# Patient Record
Sex: FEMALE | Race: WHITE | Employment: FULL TIME | URBAN - NONMETROPOLITAN AREA
[De-identification: names, ages, dates, MRNs, and addresses within clinical notes are randomized per-mention and may not be internally consistent; named-entity substitution may affect disease eponyms.]

---

## 2017-02-22 ENCOUNTER — OFFICE VISIT (OUTPATIENT)
Dept: OBGYN | Age: 22
End: 2017-02-22

## 2017-02-22 VITALS
RESPIRATION RATE: 16 BRPM | BODY MASS INDEX: 24.35 KG/M2 | DIASTOLIC BLOOD PRESSURE: 66 MMHG | HEIGHT: 61 IN | WEIGHT: 129 LBS | SYSTOLIC BLOOD PRESSURE: 102 MMHG | HEART RATE: 76 BPM

## 2017-02-22 DIAGNOSIS — Z30.011 ENCOUNTER FOR INITIAL PRESCRIPTION OF CONTRACEPTIVE PILLS: Primary | ICD-10-CM

## 2017-02-22 PROCEDURE — 99214 OFFICE O/P EST MOD 30 MIN: CPT | Performed by: OBSTETRICS & GYNECOLOGY

## 2017-02-22 RX ORDER — NORETHINDRONE ACETATE AND ETHINYL ESTRADIOL 1MG-20(21)
1 KIT ORAL DAILY
Qty: 1 PACKET | Refills: 3 | Status: SHIPPED | OUTPATIENT
Start: 2017-02-22 | End: 2017-07-26 | Stop reason: ALTCHOICE

## 2017-02-22 ASSESSMENT — ENCOUNTER SYMPTOMS
VOMITING: 0
ABDOMINAL PAIN: 0
NAUSEA: 0
COUGH: 0
SHORTNESS OF BREATH: 0
CHEST TIGHTNESS: 0

## 2017-07-26 ENCOUNTER — HOSPITAL ENCOUNTER (OUTPATIENT)
Age: 22
Setting detail: SPECIMEN
Discharge: HOME | End: 2017-07-26

## 2017-07-26 ENCOUNTER — OFFICE VISIT (OUTPATIENT)
Dept: PRIMARY CARE CLINIC | Age: 22
End: 2017-07-26
Payer: MEDICAID

## 2017-07-26 VITALS
SYSTOLIC BLOOD PRESSURE: 100 MMHG | OXYGEN SATURATION: 99 % | WEIGHT: 125.6 LBS | HEIGHT: 61 IN | HEART RATE: 82 BPM | DIASTOLIC BLOOD PRESSURE: 80 MMHG | TEMPERATURE: 98.4 F | RESPIRATION RATE: 16 BRPM | BODY MASS INDEX: 23.71 KG/M2

## 2017-07-26 DIAGNOSIS — Z20.2 POSSIBLE EXPOSURE TO STD: Primary | ICD-10-CM

## 2017-07-26 LAB
-: NORMAL
AMORPHOUS: NORMAL
BACTERIA: NORMAL
BILIRUBIN URINE: NEGATIVE
CASTS UA: NORMAL /LPF (ref 0–2)
COLOR: ABNORMAL
COMMENT UA: ABNORMAL
CRYSTALS, UA: NORMAL /HPF
EPITHELIAL CELLS UA: NORMAL /HPF (ref 0–5)
GLUCOSE URINE: NEGATIVE
HCG(URINE) PREGNANCY TEST: POSITIVE
KETONES, URINE: NEGATIVE
LEUKOCYTE ESTERASE, URINE: NEGATIVE
MUCUS: NORMAL
NITRITE, URINE: NEGATIVE
OTHER OBSERVATIONS UA: NORMAL
PH UA: 6 (ref 5–6)
PROTEIN UA: NEGATIVE
RBC UA: NORMAL /HPF (ref 0–4)
RENAL EPITHELIAL, UA: NORMAL /HPF
SPECIFIC GRAVITY UA: 1 (ref 1.01–1.02)
TRICHOMONAS: NORMAL
TURBIDITY: ABNORMAL
URINE HGB: NEGATIVE
UROBILINOGEN, URINE: NORMAL
WBC UA: NORMAL /HPF (ref 0–4)
YEAST: NORMAL

## 2017-07-26 PROCEDURE — 99213 OFFICE O/P EST LOW 20 MIN: CPT | Performed by: NURSE PRACTITIONER

## 2017-07-26 PROCEDURE — 81001 URINALYSIS AUTO W/SCOPE: CPT | Performed by: NURSE PRACTITIONER

## 2017-07-26 PROCEDURE — 81003 URINALYSIS AUTO W/O SCOPE: CPT | Performed by: NURSE PRACTITIONER

## 2017-07-26 PROCEDURE — 84703 CHORIONIC GONADOTROPIN ASSAY: CPT | Performed by: NURSE PRACTITIONER

## 2017-07-26 ASSESSMENT — ENCOUNTER SYMPTOMS
COUGH: 0
WHEEZING: 0
SHORTNESS OF BREATH: 0

## 2017-07-28 ENCOUNTER — TELEPHONE (OUTPATIENT)
Dept: FAMILY MEDICINE CLINIC | Age: 22
End: 2017-07-28

## 2017-07-28 DIAGNOSIS — A74.9 CHLAMYDIA INFECTION: Primary | ICD-10-CM

## 2017-07-28 LAB
C. TRACHOMATIS DNA ,URINE: ABNORMAL
N. GONORRHOEAE DNA, URINE: NEGATIVE

## 2017-07-28 RX ORDER — AZITHROMYCIN 500 MG/1
1000 TABLET, FILM COATED ORAL ONCE
Qty: 2 TABLET | Refills: 0 | Status: SHIPPED | OUTPATIENT
Start: 2017-07-28 | End: 2017-07-28

## 2018-02-06 ENCOUNTER — OFFICE VISIT (OUTPATIENT)
Dept: OPTOMETRY | Age: 23
End: 2018-02-06
Payer: COMMERCIAL

## 2018-02-06 DIAGNOSIS — H52.203 MYOPIA OF BOTH EYES WITH ASTIGMATISM: Primary | ICD-10-CM

## 2018-02-06 DIAGNOSIS — H52.13 MYOPIA OF BOTH EYES WITH ASTIGMATISM: Primary | ICD-10-CM

## 2018-02-06 PROCEDURE — 92014 COMPRE OPH EXAM EST PT 1/>: CPT | Performed by: OPTOMETRIST

## 2018-02-06 ASSESSMENT — REFRACTION_MANIFEST
OD_CYLINDER: -1.25
OS_SPHERE: -1.75
OS_CYLINDER: -0.50
OD_SPHERE: -1.00
OS_AXIS: 177
OD_AXIS: 005

## 2018-02-06 ASSESSMENT — TONOMETRY
OD_IOP_MMHG: 15
IOP_METHOD: APPLANATION W FLURESS DROP
OS_IOP_MMHG: 15

## 2018-02-06 ASSESSMENT — REFRACTION_WEARINGRX
OD_CYLINDER: -1.00
OD_SPHERE: -1.25
SPECS_TYPE: SVL
OS_SPHERE: -1.75
OD_AXIS: 005
OS_CYLINDER: -0.50
OS_AXIS: 180

## 2018-02-06 ASSESSMENT — REFRACTION_CURRENTRX
OD_SPHERE: -1.75
OS_SPHERE: -2.00
OS_BRAND: FRESHLOOK COLORS
OD_BRAND: FRESHLOOK COLORS

## 2018-02-06 ASSESSMENT — SLIT LAMP EXAM - LIDS
COMMENTS: NORMAL
COMMENTS: NORMAL

## 2018-02-06 ASSESSMENT — VISUAL ACUITY
OS_SC: 20/70
OD_SC+: -1
OD_SC: 20/60
METHOD: SNELLEN - LINEAR

## 2018-02-06 NOTE — PROGRESS NOTES
Marylene Rideau presents today for   Chief Complaint   Patient presents with    Vision Exam   .    HPI     Last Vision Exam: 12/8/2016 Dr Aurora Larkin  Last Ophthalmology Exam: none  Last Filled Glasses Rx: 12/8/2016  Insurance: March vision, eli for e,f,l  Update: glasses- broken, wants CL update for written Rx to take(orders on line)  Decided not to do the contact lenses now   Will call when ready                 Main Ophthalmology Exam     External Exam       Right Left    External Normal Normal          Slit Lamp Exam       Right Left    Lids/Lashes Normal Normal    Conjunctiva/Sclera White and quiet White and quiet    Cornea Clear Clear    Anterior Chamber Deep and quiet Deep and quiet    Iris Round and reactive Round and reactive    Lens Clear Clear    Vitreous Normal Normal          Fundus Exam       Right Left    Disc Normal Normal    C/D Ratio 0.25 0.25    Macula Normal Normal    Vessels Normal Normal                   Tonometry     Tonometry (Applanation w Fluress drop, 11:09 AM)       Right Left    Pressure 15 15               Visual Acuity (Snellen - Linear)       Right Left    Dist sc 20/60 -1 20/70         Not recorded          Ophthalmology Exam     Wearing Rx       Sphere Cylinder Axis    Right -1.25 -1.00 005    Left -1.75 -0.50 180    Type:  SVL                Manifest Refraction     Manifest Refraction       Sphere Cylinder [place] Dist [place]    Right -1.00 -1.25 005 20/20    Left -1.75 -0.50 177 20/20          Manifest Refraction #2 (Auto)       Sphere Cylinder [place] Dist VA    Right -1.00 -1.50 002     Left -1.75 -0.75 178                Final Rx       Sphere Cylinder Axis    Right -1.00 -1.25 005    Left -1.75 -0.50 177    Type:  SVL    Expiration Date:  2/7/2020            1. Myopia of both eyes with astigmatism           Patient Instructions   New glasses recommended;  Call and schedule to update the contact lenses      Return in about 2 years (around 2/6/2020) for complete eye exam.

## 2018-07-12 ENCOUNTER — OFFICE VISIT (OUTPATIENT)
Dept: OPTOMETRY | Age: 23
End: 2018-07-12

## 2018-07-12 DIAGNOSIS — H52.203 MYOPIA OF BOTH EYES WITH ASTIGMATISM: Primary | ICD-10-CM

## 2018-07-12 DIAGNOSIS — H52.13 MYOPIA OF BOTH EYES WITH ASTIGMATISM: Primary | ICD-10-CM

## 2018-07-12 PROCEDURE — 92314 C-LENS FITG TECH OU: CPT | Performed by: OPTOMETRIST

## 2018-07-12 ASSESSMENT — REFRACTION_CURRENTRX
OS_ADDL_SPECS: BLUE
OS_BRAND: CIBA: FRESHLOOK COLORBLENDS
OS_SPHERE: -2.00
OD_SPHERE: -1.75
OD_ADDL_SPECS: BLUE
OD_BRAND: CIBA: FRESHLOOK COLORBLENDS

## 2018-07-12 ASSESSMENT — REFRACTION_MANIFEST
OS_CYLINDER: -0.50
OS_AXIS: 177
OD_CYLINDER: -1.25
OD_AXIS: 004
OS_SPHERE: -1.50
OD_SPHERE: -1.00

## 2018-07-12 ASSESSMENT — KERATOMETRY
OS_AXISANGLE_DEGREES: 95
OD_AXISANGLE_DEGREES: 84
OS_K2POWER_DIOPTERS: 46.25
OD_AXISANGLE2_DEGREES: 174
OS_K1POWER_DIOPTERS: 44.50
OD_K1POWER_DIOPTERS: 44.25
OD_K2POWER_DIOPTERS: 45.50
OS_AXISANGLE2_DEGREES: 5

## 2018-07-12 ASSESSMENT — VISUAL ACUITY
OD_SC+: -1
OD_SC: 20/80
METHOD: SNELLEN - LINEAR
OS_SC: 20/80
OS_SC+: -1

## 2018-07-12 ASSESSMENT — REFRACTION_WEARINGRX
OS_CYLINDER: -0.50
OS_SPHERE: -1.75
OS_AXIS: 177
OD_CYLINDER: -1.25
OD_AXIS: 005
OD_SPHERE: -1.00
SPECS_TYPE: SVL

## 2018-07-12 NOTE — PROGRESS NOTES
Eze Sepulveda presents today for   Chief Complaint   Patient presents with    Vision Exam   .    HPI     Last Vision Exam: 2/6/18  Last Ophthalmology Exam: N/A  Last Filled Glasses Rx: 2/6/18  Insurance: No Ins. Update: Contacts  Does not want colored contacts  Last wore contacts a year ago. Clear lenses                      Visual Acuity (Snellen - Linear)       Right Left    Dist sc 20/80 -1 20/80 -1        Keratometry     Keratometry       K1 Axis K2 Axis    Right 44.25 174 45.50 84    Left 44.50 5 46.25 95                Ophthalmology Exam     Wearing Rx       Sphere Cylinder Axis    Right -1.00 -1.25 005    Left -1.75 -0.50 177    Age:  6m    Type:  SVL                Manifest Refraction     Manifest Refraction       Sphere Cylinder Axis    Right -1.00 -1.25 004    Left -1.50 -0.50 177          Manifest Refraction #2 (Auto)       Sphere Cylinder Axis    Right -1.00 -1.50 002    Left -1.75 -1.00 176                 Final Rx       Sphere Cylinder Axis    Right -1.00 -1.25 004    Left -1.50 -0.50 177    Type:  SVL    Expiration Date:  7/12/2020           Contact Lens Current Rx     Current Contact Lens Rx       Brand Sphere Addl. Specs    Right Ciba: Freshlook Colorblends -1.75 Blue    Left Ciba: Freshlook Colorblends -2.00 Blue                TRIALS   Final Contact Lens Rx       Brand Sphere Cylinder Axis    Right air optix for astigmatism  -1.25 -0.75 180    Left air optix -1.75      Expiration Date:  7/2019    Replacement:  Monthly    Wearing Schedule:  Daily wear           Plan:     1. Myopia of both eyes with astigmatism             Patient Instructions   New contact lisa trials given;  $40 fit fee      Return in about 2 weeks (around 7/26/2018) for contact lens follow-up.

## 2019-03-13 ENCOUNTER — TELEPHONE (OUTPATIENT)
Dept: OPTOMETRY | Age: 24
End: 2019-03-13

## 2019-03-18 ASSESSMENT — SLIT LAMP EXAM - LIDS
COMMENTS: NORMAL
COMMENTS: NORMAL

## 2019-09-23 ENCOUNTER — TELEPHONE (OUTPATIENT)
Dept: OPTOMETRY | Age: 24
End: 2019-09-23

## 2019-09-24 ENCOUNTER — TELEPHONE (OUTPATIENT)
Dept: OPTOMETRY | Age: 24
End: 2019-09-24

## 2019-09-27 ENCOUNTER — OFFICE VISIT (OUTPATIENT)
Dept: PRIMARY CARE CLINIC | Age: 24
End: 2019-09-27
Payer: MEDICAID

## 2019-09-27 ENCOUNTER — HOSPITAL ENCOUNTER (OUTPATIENT)
Age: 24
Setting detail: SPECIMEN
Discharge: HOME | End: 2019-09-27
Payer: MEDICAID

## 2019-09-27 VITALS
OXYGEN SATURATION: 98 % | HEART RATE: 88 BPM | BODY MASS INDEX: 25.34 KG/M2 | TEMPERATURE: 98.2 F | DIASTOLIC BLOOD PRESSURE: 70 MMHG | RESPIRATION RATE: 16 BRPM | HEIGHT: 61 IN | SYSTOLIC BLOOD PRESSURE: 120 MMHG | WEIGHT: 134.2 LBS

## 2019-09-27 DIAGNOSIS — R30.0 DYSURIA: Primary | ICD-10-CM

## 2019-09-27 DIAGNOSIS — R30.0 DYSURIA: ICD-10-CM

## 2019-09-27 DIAGNOSIS — N30.01 ACUTE CYSTITIS WITH HEMATURIA: ICD-10-CM

## 2019-09-27 LAB
-: ABNORMAL
AMORPHOUS: ABNORMAL
BACTERIA: ABNORMAL
BILIRUBIN URINE: NEGATIVE
CASTS UA: ABNORMAL /LPF (ref 0–2)
COLOR: ABNORMAL
COMMENT UA: ABNORMAL
CRYSTALS, UA: ABNORMAL /HPF
EPITHELIAL CELLS UA: ABNORMAL /HPF (ref 0–5)
GLUCOSE URINE: NEGATIVE
KETONES, URINE: ABNORMAL
LEUKOCYTE ESTERASE, URINE: ABNORMAL
MUCUS: ABNORMAL
NITRITE, URINE: NEGATIVE
OTHER OBSERVATIONS UA: ABNORMAL
PH UA: 6 (ref 5–6)
PROTEIN UA: ABNORMAL
RBC UA: ABNORMAL /HPF (ref 0–4)
RENAL EPITHELIAL, UA: ABNORMAL /HPF
SPECIFIC GRAVITY UA: 1.02 (ref 1.01–1.02)
TRICHOMONAS: ABNORMAL
TURBIDITY: ABNORMAL
URINE HGB: ABNORMAL
UROBILINOGEN, URINE: NORMAL
WBC UA: >50 /HPF (ref 0–4)
YEAST: ABNORMAL

## 2019-09-27 PROCEDURE — 1036F TOBACCO NON-USER: CPT | Performed by: NURSE PRACTITIONER

## 2019-09-27 PROCEDURE — 99214 OFFICE O/P EST MOD 30 MIN: CPT | Performed by: NURSE PRACTITIONER

## 2019-09-27 PROCEDURE — 81001 URINALYSIS AUTO W/SCOPE: CPT

## 2019-09-27 PROCEDURE — G8427 DOCREV CUR MEDS BY ELIG CLIN: HCPCS | Performed by: NURSE PRACTITIONER

## 2019-09-27 PROCEDURE — G8419 CALC BMI OUT NRM PARAM NOF/U: HCPCS | Performed by: NURSE PRACTITIONER

## 2019-09-27 RX ORDER — NITROFURANTOIN 25; 75 MG/1; MG/1
100 CAPSULE ORAL 2 TIMES DAILY
Qty: 20 CAPSULE | Refills: 0 | Status: SHIPPED | OUTPATIENT
Start: 2019-09-27 | End: 2019-10-07

## 2019-09-27 ASSESSMENT — PATIENT HEALTH QUESTIONNAIRE - PHQ9
SUM OF ALL RESPONSES TO PHQ9 QUESTIONS 1 & 2: 0
2. FEELING DOWN, DEPRESSED OR HOPELESS: 0
SUM OF ALL RESPONSES TO PHQ QUESTIONS 1-9: 0
SUM OF ALL RESPONSES TO PHQ QUESTIONS 1-9: 0
1. LITTLE INTEREST OR PLEASURE IN DOING THINGS: 0

## 2019-09-27 NOTE — PROGRESS NOTES
[address]  [facility]  [address] [facility], [place]   Phone: [phone]  Fax: [phone]    Date: 9/27/2019   Patient:  Ifrah Salomon   YOB: 1995 Age: 25 y.o. MRN: [id number]   PCP: Ilia Schroeder MD       Subjective:    Chief Complaint   Patient presents with    Urinary Tract Infection      x 2 days       HPI: Patient presents with complaints of dysuria for the past 2 days. She reports associated mild suprapubic tenderness. She denies fever, nausea, vomiting, hematuria, or flank pain. She has tried cranberry juice without relief. She has had 1 UTI before. She has never had pyelonephritis. She has had kidney stones before. History is obtained from the patient and previous medical records. All other review of systems negative. No Known Allergies    Current Outpatient Medications   Medication Sig Dispense Refill    nitrofurantoin, macrocrystal-monohydrate, (MACROBID) 100 MG capsule Take 1 capsule by mouth 2 times daily for 10 days 20 capsule 0     No current facility-administered medications for this visit. No past medical history on file. Social History     Tobacco Use    Smoking status: Never Smoker    Smokeless tobacco: Never Used   Substance Use Topics    Alcohol use: Yes     Alcohol/week: 0.0 standard drinks     Comment: socially    Drug use: No       Significant family and surgical history reviewed as noted in the patient's record. Objective:    Physical Exam:  Vitals: /70 (Site: Left Upper Arm, Position: Sitting, Cuff Size: Medium Adult)   Pulse 88   Temp 98.2 °F (36.8 °C) (Tympanic)   Resp 16   Ht 5' 1\" (1.549 m)   Wt 134 lb 3.2 oz (60.9 kg)   LMP 09/06/2019 (Approximate)   SpO2 98%   Breastfeeding? No   BMI 25.36 kg/m²     LABS:  CBC:  No results for input(s): WBC, HGB, PLT in the last 72 hours. BMP:  No results for input(s): NA, K, CL, CO2, BUN, CREATININE, GLUCOSE in the last 72 hours.   Hepatic:  No results for

## 2020-01-15 ENCOUNTER — OFFICE VISIT (OUTPATIENT)
Dept: OPTOMETRY | Age: 25
End: 2020-01-15
Payer: COMMERCIAL

## 2020-01-15 PROCEDURE — 92015 DETERMINE REFRACTIVE STATE: CPT | Performed by: OPTOMETRIST

## 2020-01-15 PROCEDURE — 92014 COMPRE OPH EXAM EST PT 1/>: CPT | Performed by: OPTOMETRIST

## 2020-01-15 PROCEDURE — G8419 CALC BMI OUT NRM PARAM NOF/U: HCPCS | Performed by: OPTOMETRIST

## 2020-01-15 PROCEDURE — 1036F TOBACCO NON-USER: CPT | Performed by: OPTOMETRIST

## 2020-01-15 PROCEDURE — G8427 DOCREV CUR MEDS BY ELIG CLIN: HCPCS | Performed by: OPTOMETRIST

## 2020-01-15 ASSESSMENT — VISUAL ACUITY
OS_SC: 20/70
OD_SC: 20/80
METHOD: SNELLEN - LINEAR

## 2020-01-15 ASSESSMENT — REFRACTION_WEARINGRX
SPECS_TYPE: SVL
OD_CYLINDER: -1.25
OS_SPHERE: -1.75
OS_CYLINDER: -0.50
OD_SPHERE: -1.00
OS_AXIS: 177
OD_AXIS: 005

## 2020-01-15 ASSESSMENT — REFRACTION_MANIFEST
OS_SPHERE: -1.75
OS_AXIS: 175
OS_CYLINDER: -0.50
OD_CYLINDER: -1.25
OD_SPHERE: -1.25
OD_AXIS: 180

## 2020-01-15 ASSESSMENT — REFRACTION_CURRENTRX
OD_AXIS: 180
OS_SPHERE: -1.75
OD_CYLINDER: -0.75
OD_SPHERE: -1.25
OS_BRAND: AIR OPTIX
OD_BRAND: AIR OPTIX FOR ASTIGMATISM

## 2020-01-15 ASSESSMENT — TONOMETRY
OD_IOP_MMHG: 16
IOP_METHOD: NON-CONTACT AIR PUFF
OS_IOP_MMHG: 17

## 2020-01-15 ASSESSMENT — SLIT LAMP EXAM - LIDS
COMMENTS: NORMAL
COMMENTS: NORMAL

## 2020-01-15 NOTE — PROGRESS NOTES
abused: None     Forced sexual activity: None   Other Topics Concern    None   Social History Narrative    None     History reviewed. No pertinent past medical history.     Neuro/Psych     Neuro/Psych     Oriented x3:  Yes    Mood/Affect:  Normal                Main Ophthalmology Exam     External Exam       Right Left    External Normal Normal          Slit Lamp Exam       Right Left    Lids/Lashes Normal Normal    Conjunctiva/Sclera White and quiet White and quiet    Cornea Clear Clear    Anterior Chamber Deep and quiet Deep and quiet    Iris Round and reactive Round and reactive    Lens Clear Clear    Vitreous Normal Normal          Fundus Exam       Right Left    Disc Normal Normal    C/D Ratio 0.2 0.2    Macula Normal Normal    Vessels Normal Normal                  Tonometry     Tonometry (Non-contact air puff, 3:05 PM)       Right Left    Pressure 16 17   IOP.1              18.0  CH:  17.6          11.6  WS: 2.8          7.0                     Visual Acuity (Snellen - Linear)       Right Left    Dist sc 20/80 20/70         Not recorded          Ophthalmology Exam     Wearing Rx       Sphere Cylinder Axis    Right -1.00 -1.25 005    Left -1.75 -0.50 177    Age:  2yrs    Type:  SVL                Manifest Refraction     Manifest Refraction       Sphere Cylinder [place] Dist VA    Right -1.25 -1.25 180 20/20    Left -1.75 -0.50 175 20/20          Manifest Refraction #2 (Auto)       Sphere Cylinder [place] Dist VA    Right -1.00 -1.25 180     Left -1.50 -1.00 178                  Final Rx       Sphere Cylinder Axis    Right -1.25 -1.25 180    Left -1.75 -0.50 175    Type:  SVL    Expiration Date:  1/15/2022           Contact Lens Current Rx     Current Contact Lens Rx       Brand Sphere Cylinder Axis    Right air optix for astigmatism  -1.25 -0.75 180    Left air optix -1.75                  Final   Final Contact Lens Rx       Brand Base Curve Sphere Cylinder Axis    Right air optix for astigmatism   -1.50 -0.75 180    Left air optix 8.6 -2.00             Plan:     1. Myopia of both eyes with astigmatism             Patient Instructions   New trials given;  40$ fit fee needed;  2 week follow up      Return in about 2 weeks (around 1/29/2020) for contact lens follow-up.

## 2020-10-27 ENCOUNTER — TELEPHONE (OUTPATIENT)
Dept: OBGYN | Age: 25
End: 2020-10-27

## 2021-11-11 ENCOUNTER — OFFICE VISIT (OUTPATIENT)
Dept: OPTOMETRY | Age: 26
End: 2021-11-11
Payer: COMMERCIAL

## 2021-11-11 DIAGNOSIS — H52.203 MYOPIA OF BOTH EYES WITH ASTIGMATISM: ICD-10-CM

## 2021-11-11 DIAGNOSIS — H52.13 MYOPIA OF BOTH EYES WITH ASTIGMATISM: ICD-10-CM

## 2021-11-11 PROCEDURE — 92014 COMPRE OPH EXAM EST PT 1/>: CPT | Performed by: OPTOMETRIST

## 2021-11-11 PROCEDURE — 92015 DETERMINE REFRACTIVE STATE: CPT | Performed by: OPTOMETRIST

## 2021-11-11 ASSESSMENT — REFRACTION_MANIFEST
OD_CYLINDER: -1.25
OS_CYLINDER: -0.75
OD_AXIS: 180
OD_SPHERE: -1.25
OS_AXIS: 180
OS_SPHERE: -1.75

## 2021-11-11 ASSESSMENT — KERATOMETRY
OD_AXISANGLE_DEGREES: 081
OS_K2POWER_DIOPTERS: 45.75
OS_AXISANGLE2_DEGREES: 007
OS_AXISANGLE_DEGREES: 097
OS_K1POWER_DIOPTERS: 44.75
OD_K1POWER_DIOPTERS: 44.50
OD_AXISANGLE2_DEGREES: 171
METHOD_AUTO_MANUAL: AUTOMATED
OD_K2POWER_DIOPTERS: 46.00

## 2021-11-11 ASSESSMENT — REFRACTION_CURRENTRX
OS_SPHERE: -2.00
OD_AXIS: 180
OD_SPHERE: -1.50
OS_BASECURVE: 8.6
OS_BRAND: AIR OPTIX
OD_BRAND: AIR OPTIX FOR ASTIGMATISM
OD_CYLINDER: -0.75

## 2021-11-11 ASSESSMENT — VISUAL ACUITY
OD_SC: 20/80
METHOD: SNELLEN - LINEAR
OS_SC: 20/80

## 2021-11-11 ASSESSMENT — TONOMETRY
IOP_METHOD: NON-CONTACT AIR PUFF
OS_IOP_MMHG: 17
OD_IOP_MMHG: 19

## 2021-11-11 ASSESSMENT — ENCOUNTER SYMPTOMS
ALLERGIC/IMMUNOLOGIC NEGATIVE: 0
RESPIRATORY NEGATIVE: 0
EYES NEGATIVE: 0
GASTROINTESTINAL NEGATIVE: 0

## 2021-11-11 ASSESSMENT — REFRACTION_WEARINGRX
OD_AXIS: 180
OD_CYLINDER: -1.25
SPECS_TYPE: SVL
OS_AXIS: 175
OS_SPHERE: -1.75
OD_SPHERE: -1.25
OS_CYLINDER: -0.50

## 2021-11-11 NOTE — PATIENT INSTRUCTIONS
New glasses and contact lenses;  Using insurance towards the glasses;   We will hold the trials out for fit fee $57 and then we will also put in an order for one box of each eye

## 2021-11-11 NOTE — PROGRESS NOTES
Member of Clubs or Organizations: Not on file    Attends Club or Organization Meetings: Not on file    Marital Status: Not on file   Intimate Partner Violence:     Fear of Current or Ex-Partner: Not on file    Emotionally Abused: Not on file    Physically Abused: Not on file    Sexually Abused: Not on file   Housing Stability:     Unable to Pay for Housing in the Last Year: Not on file    Number of [place] in the Last Year: Not on file    Unstable Housing in the Last Year: Not on file     History reviewed. No pertinent past medical history.     ROS     Negative for: Constitutional, Gastrointestinal, Neurological, Skin, Genitourinary, Musculoskeletal, HENT, Endocrine, Cardiovascular, Eyes, Respiratory, Psychiatric, Allergic/Imm, Heme/Lymph          Main Ophthalmology Exam     External Exam       Right Left    External Normal Normal             <div id=\"MAIN_EXAM_REVIEWED\"></div>      Tonometry     Tonometry (Non-contact air puff, 4:12 PM)       Right Left    Pressure 19 17      Right / Left  IOPg 19.3 / 18.2  CH 11.1 / 11.7  WS 7.0 / 5.9                   Not recorded       Not recorded         Visual Acuity (Snellen - Linear)       Right Left    Dist sc 20/80 20/80          Pupils     Pupils       Pupils    Right PERRL    Left PERRL              Neuro/Psych     Neuro/Psych     Oriented x3: Yes    Mood/Affect: Normal              Keratometry     Keratometry (Automated)       K1 Axis K2 Axis    Right 44.50 171 46.00 081    Left 44.75 007 45.75 097                  Ophthalmology Exam     Wearing Rx       Sphere Cylinder Axis    Right -1.25 -1.25 180    Left -1.75 -0.50 175    Age: 1yr    Type: SVL              Manifest Refraction     Manifest Refraction       Sphere Cylinder Axis    Right -1.25 -1.25 180    Left -1.75 -0.75 180          Manifest Refraction #2 (Auto)       Sphere Cylinder Axis    Right -1.25 -1.25 179    Left -1.75 -1.00 002               Final Rx       Sphere Cylinder Axis    Right -1.25 -1.25 180    Left -1.75 -0.75 180    Type: SVL    Expiration Date: 11/12/2023          Final Contact Lens Rx       Brand Base Curve Sphere Cylinder Axis    Right air optix for astigmatism   -1.50 -0.75 180    Left air optix 8.6 -2.00      Expiration Date: 11/12/2022    Replacement: Monthly    Wearing Schedule: Daily wear   Final          No orders of the defined types were placed in this encounter. IMPRESSION:  1. Myopia of both eyes with astigmatism        PLAN:    1. New contact lens trials given; Ok to order;  2 boxes each eye; and new glasses recommended       Patient Instructions   New glasses and contact lenses;  Using insurance towards the glasses;   We will hold the trials out for fit fee $57 and then we will also put in an order for one box of each eye      Return in about 2 years (around 11/11/2023) for complete eye exam.

## 2022-08-24 ENCOUNTER — OFFICE VISIT (OUTPATIENT)
Dept: PRIMARY CARE CLINIC | Age: 27
End: 2022-08-24
Payer: COMMERCIAL

## 2022-08-24 VITALS
SYSTOLIC BLOOD PRESSURE: 96 MMHG | BODY MASS INDEX: 24.17 KG/M2 | OXYGEN SATURATION: 99 % | HEIGHT: 61 IN | HEART RATE: 62 BPM | DIASTOLIC BLOOD PRESSURE: 68 MMHG | WEIGHT: 128 LBS | TEMPERATURE: 98.2 F | RESPIRATION RATE: 18 BRPM

## 2022-08-24 DIAGNOSIS — K04.7 DENTAL INFECTION: Primary | ICD-10-CM

## 2022-08-24 PROCEDURE — 1036F TOBACCO NON-USER: CPT | Performed by: NURSE PRACTITIONER

## 2022-08-24 PROCEDURE — 99213 OFFICE O/P EST LOW 20 MIN: CPT | Performed by: NURSE PRACTITIONER

## 2022-08-24 PROCEDURE — 99212 OFFICE O/P EST SF 10 MIN: CPT | Performed by: NURSE PRACTITIONER

## 2022-08-24 PROCEDURE — G8427 DOCREV CUR MEDS BY ELIG CLIN: HCPCS | Performed by: NURSE PRACTITIONER

## 2022-08-24 PROCEDURE — G8420 CALC BMI NORM PARAMETERS: HCPCS | Performed by: NURSE PRACTITIONER

## 2022-08-24 RX ORDER — PENICILLIN V POTASSIUM 500 MG/1
500 TABLET ORAL 4 TIMES DAILY
Qty: 40 TABLET | Refills: 0 | Status: SHIPPED | OUTPATIENT
Start: 2022-08-24 | End: 2022-09-03

## 2022-08-24 SDOH — ECONOMIC STABILITY: FOOD INSECURITY: WITHIN THE PAST 12 MONTHS, YOU WORRIED THAT YOUR FOOD WOULD RUN OUT BEFORE YOU GOT MONEY TO BUY MORE.: NEVER TRUE

## 2022-08-24 SDOH — ECONOMIC STABILITY: FOOD INSECURITY: WITHIN THE PAST 12 MONTHS, THE FOOD YOU BOUGHT JUST DIDN'T LAST AND YOU DIDN'T HAVE MONEY TO GET MORE.: NEVER TRUE

## 2022-08-24 ASSESSMENT — SOCIAL DETERMINANTS OF HEALTH (SDOH): HOW HARD IS IT FOR YOU TO PAY FOR THE VERY BASICS LIKE FOOD, HOUSING, MEDICAL CARE, AND HEATING?: NOT HARD AT ALL

## 2022-08-24 ASSESSMENT — PATIENT HEALTH QUESTIONNAIRE - PHQ9
1. LITTLE INTEREST OR PLEASURE IN DOING THINGS: 0
SUM OF ALL RESPONSES TO PHQ QUESTIONS 1-9: 0
SUM OF ALL RESPONSES TO PHQ9 QUESTIONS 1 & 2: 0
SUM OF ALL RESPONSES TO PHQ QUESTIONS 1-9: 0
2. FEELING DOWN, DEPRESSED OR HOPELESS: 0

## 2022-08-24 ASSESSMENT — ENCOUNTER SYMPTOMS
RHINORRHEA: 0
SORE THROAT: 0
SINUS PRESSURE: 0

## 2022-08-24 NOTE — PROGRESS NOTES
[address] [facility]  [address] [facility], [id number]  [phone]      HPI:     Dental Pain   This is a new problem. The current episode started in the past 7 days. The problem occurs constantly. The problem has been unchanged. The pain is at a severity of 3/10. Associated symptoms include facial pain and thermal sensitivity. Pertinent negatives include no fever or sinus pressure. She has tried acetaminophen and NSAIDs for the symptoms. The treatment provided no relief. Current Outpatient Medications   Medication Sig Dispense Refill    penicillin v potassium (VEETID) 500 MG tablet Take 1 tablet by mouth 4 times daily for 10 days 40 tablet 0     No current facility-administered medications for this visit. No Known Allergies    All patients pastmedical, surgical, social and family history has been reviewed. Subjective:      Review of Systems   Constitutional:  Negative for activity change, appetite change, fatigue and fever. HENT:  Positive for dental problem. Negative for congestion, postnasal drip, rhinorrhea, sinus pressure and sore throat. Neurological:  Negative for dizziness, light-headedness and headaches. Objective:      Physical Exam  Vitals and nursing note reviewed. Constitutional:       Appearance: Normal appearance. HENT:      Head: Normocephalic and atraumatic. Mouth/Throat:      Dentition: Dental caries present. Skin:     General: Skin is warm. Capillary Refill: Capillary refill takes less than 2 seconds. Neurological:      General: No focal deficit present. Mental Status: She is alert and oriented to person, place, and time. Assessment:       Diagnosis Orders   1. Dental infection            Plan:      Veetid 500mg 1 tablet 4 times a day for 10 days  Continue to contact dentist for soonest appt  Pt to return if symptoms do not improve or worsen   Return PRN  No follow-ups on file.   No orders of the defined types were placed in this encounter. Orders Placed This Encounter   Medications    penicillin v potassium (VEETID) 500 MG tablet     Sig: Take 1 tablet by mouth 4 times daily for 10 days     Dispense:  40 tablet     Refill:  0       Patient given educational materials - see patient instructions. All patient questionsanswered. Pt voiced understanding. Reviewed health maintenance.      Electronically signed by PRINCESS Louis CNP, CNP on 8/24/2022 at 12:57 PM

## 2024-07-20 ENCOUNTER — OFFICE VISIT (OUTPATIENT)
Dept: PRIMARY CARE CLINIC | Age: 29
End: 2024-07-20
Payer: COMMERCIAL

## 2024-07-20 VITALS
SYSTOLIC BLOOD PRESSURE: 136 MMHG | HEART RATE: 70 BPM | BODY MASS INDEX: 24.55 KG/M2 | DIASTOLIC BLOOD PRESSURE: 88 MMHG | TEMPERATURE: 99.3 F | HEIGHT: 61 IN | OXYGEN SATURATION: 99 % | WEIGHT: 130 LBS

## 2024-07-20 DIAGNOSIS — K04.7 DENTAL INFECTION: Primary | ICD-10-CM

## 2024-07-20 PROCEDURE — 99213 OFFICE O/P EST LOW 20 MIN: CPT | Performed by: NURSE PRACTITIONER

## 2024-07-20 PROCEDURE — G8427 DOCREV CUR MEDS BY ELIG CLIN: HCPCS | Performed by: NURSE PRACTITIONER

## 2024-07-20 PROCEDURE — 1036F TOBACCO NON-USER: CPT | Performed by: NURSE PRACTITIONER

## 2024-07-20 PROCEDURE — G8420 CALC BMI NORM PARAMETERS: HCPCS | Performed by: NURSE PRACTITIONER

## 2024-07-20 RX ORDER — AMOXICILLIN 500 MG/1
500 CAPSULE ORAL 3 TIMES DAILY
Qty: 30 CAPSULE | Refills: 0 | Status: SHIPPED | OUTPATIENT
Start: 2024-07-20 | End: 2024-07-30

## 2024-07-20 SDOH — ECONOMIC STABILITY: INCOME INSECURITY: HOW HARD IS IT FOR YOU TO PAY FOR THE VERY BASICS LIKE FOOD, HOUSING, MEDICAL CARE, AND HEATING?: NOT HARD AT ALL

## 2024-07-20 SDOH — ECONOMIC STABILITY: HOUSING INSECURITY
IN THE LAST 12 MONTHS, WAS THERE A TIME WHEN YOU DID NOT HAVE A STEADY PLACE TO SLEEP OR SLEPT IN A SHELTER (INCLUDING NOW)?: NO

## 2024-07-20 SDOH — ECONOMIC STABILITY: FOOD INSECURITY: WITHIN THE PAST 12 MONTHS, YOU WORRIED THAT YOUR FOOD WOULD RUN OUT BEFORE YOU GOT MONEY TO BUY MORE.: NEVER TRUE

## 2024-07-20 SDOH — ECONOMIC STABILITY: FOOD INSECURITY: WITHIN THE PAST 12 MONTHS, THE FOOD YOU BOUGHT JUST DIDN'T LAST AND YOU DIDN'T HAVE MONEY TO GET MORE.: NEVER TRUE

## 2024-07-20 ASSESSMENT — PATIENT HEALTH QUESTIONNAIRE - PHQ9
SUM OF ALL RESPONSES TO PHQ QUESTIONS 1-9: 0
2. FEELING DOWN, DEPRESSED OR HOPELESS: NOT AT ALL
SUM OF ALL RESPONSES TO PHQ QUESTIONS 1-9: 0
SUM OF ALL RESPONSES TO PHQ QUESTIONS 1-9: 0
SUM OF ALL RESPONSES TO PHQ9 QUESTIONS 1 & 2: 0
SUM OF ALL RESPONSES TO PHQ QUESTIONS 1-9: 0
1. LITTLE INTEREST OR PLEASURE IN DOING THINGS: NOT AT ALL

## 2024-07-20 ASSESSMENT — ENCOUNTER SYMPTOMS
COLOR CHANGE: 0
WHEEZING: 0
COUGH: 0
ABDOMINAL DISTENTION: 0
SORE THROAT: 0
SINUS PRESSURE: 0
CHEST TIGHTNESS: 0
NAUSEA: 0
RHINORRHEA: 0
SHORTNESS OF BREATH: 0
VOMITING: 0
ABDOMINAL PAIN: 0
DIARRHEA: 0
CONSTIPATION: 0
SINUS PAIN: 0

## 2024-07-20 NOTE — PROGRESS NOTES
[facility]  [address]  [facility] [address]  Dept: [phone]  Dept Fax: [phone]    Tory Koo is a 29 y.o. female who presents today for her medical conditions/complaintsas noted below.  Tory Koo is c/o of   Chief Complaint   Patient presents with    Dental Pain     Pt reports right upper dental pain starting yesterday. Dental appt  [facility] dental .      HPI:     Patient presents to the walk in clinic for an acute evaluation. Normally a patient of Dr. Ferrell. Presents today for dental pain. Symptoms started yesterday. Reports seeing the dentist several weeks ago and was told needs wisdom teeth removed and another tooth was dead. Is scheduled to go back to the dentist on . Started having tooth pain last night. Unchanged. C/o right upper tooth pain. Denies fever, chills. Nothing is making better or worse. No treatment         No results found for: \"LABA1C\"          ( goal A1Cis < 7)   No components found for: \"LABMICR\"  No components found for: \"LDLCHOLESTEROL\", \"LDLCALC\"    (goal LDL is <100)   No results found for: \"AST\", \"ALT\", \"BUN\", \"CR\"  BP Readings from Last 3 Encounters:   24 136/88   22 96/68   19 120/70          (goal 120/80)    No past medical history on file.   Past Surgical History:   Procedure Laterality Date     SECTION         Family History   Problem Relation Age of Onset    Diabetes Paternal Grandmother     Asthma Brother     Diabetes Brother     Glaucoma Neg Hx     Cataracts Neg Hx        Social History     Tobacco Use    Smoking status: Never     Passive exposure: Never    Smokeless tobacco: Never   Substance Use Topics    Alcohol use: Yes     Alcohol/week: 0.0 standard drinks of alcohol     Comment: socially      Current Outpatient Medications   Medication Sig Dispense Refill    amoxicillin (AMOXIL) 500 MG